# Patient Record
Sex: MALE | Race: WHITE
[De-identification: names, ages, dates, MRNs, and addresses within clinical notes are randomized per-mention and may not be internally consistent; named-entity substitution may affect disease eponyms.]

---
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
## 2019-06-30 ENCOUNTER — TRANSCRIPTION ENCOUNTER (OUTPATIENT)
Age: 19
End: 2019-06-30

## 2019-07-01 ENCOUNTER — APPOINTMENT (OUTPATIENT)
Dept: PEDIATRICS | Facility: CLINIC | Age: 19
End: 2019-07-01
Payer: COMMERCIAL

## 2019-07-01 DIAGNOSIS — Z87.09 PERSONAL HISTORY OF OTHER DISEASES OF THE RESPIRATORY SYSTEM: ICD-10-CM

## 2019-07-01 LAB — S PYO AG SPEC QL IA: NORMAL

## 2019-07-01 PROCEDURE — 99213 OFFICE O/P EST LOW 20 MIN: CPT

## 2019-07-01 PROCEDURE — 87880 STREP A ASSAY W/OPTIC: CPT | Mod: QW

## 2019-07-02 VITALS — TEMPERATURE: 98.3 F

## 2019-07-02 NOTE — PHYSICAL EXAM
[Erythematous Oropharynx] : erythematous oropharynx [NL] : warm [de-identified] : moist mucus membranes

## 2019-07-02 NOTE — DISCUSSION/SUMMARY
[FreeTextEntry1] : pharyngitis. Viral illness. Rapid strep neg C/S pending. Sympt treatment. FU if not better next few days. Sooner if worse. Diet and fluid advice given.

## 2019-07-02 NOTE — HISTORY OF PRESENT ILLNESS
[de-identified] : ST  [FreeTextEntry6] : started few days ago wit V and  D along with temp.Was seen at urgent care and hydrated with IV fluids. No more V but still some loose BM. Afebrile. Compl of ST. On no meds. Feeling better today

## 2019-07-03 LAB — BACTERIA THROAT CULT: NORMAL

## 2019-07-05 ENCOUNTER — TRANSCRIPTION ENCOUNTER (OUTPATIENT)
Age: 19
End: 2019-07-05

## 2019-07-16 ENCOUNTER — APPOINTMENT (OUTPATIENT)
Dept: PEDIATRICS | Facility: CLINIC | Age: 19
End: 2019-07-16
Payer: COMMERCIAL

## 2019-07-16 VITALS
BODY MASS INDEX: 22.93 KG/M2 | HEIGHT: 72.75 IN | HEART RATE: 84 BPM | DIASTOLIC BLOOD PRESSURE: 60 MMHG | SYSTOLIC BLOOD PRESSURE: 110 MMHG | WEIGHT: 173.04 LBS

## 2019-07-16 DIAGNOSIS — B34.9 VIRAL INFECTION, UNSPECIFIED: ICD-10-CM

## 2019-07-16 DIAGNOSIS — Z00.00 ENCOUNTER FOR GENERAL ADULT MEDICAL EXAMINATION W/OUT ABNORMAL FINDINGS: ICD-10-CM

## 2019-07-16 DIAGNOSIS — K52.9 NONINFECTIVE GASTROENTERITIS AND COLITIS, UNSPECIFIED: ICD-10-CM

## 2019-07-16 DIAGNOSIS — J30.1 ALLERGIC RHINITIS DUE TO POLLEN: ICD-10-CM

## 2019-07-16 PROCEDURE — 99395 PREV VISIT EST AGE 18-39: CPT

## 2019-07-16 PROCEDURE — 96127 BRIEF EMOTIONAL/BEHAV ASSMT: CPT

## 2019-07-16 PROCEDURE — 96160 PT-FOCUSED HLTH RISK ASSMT: CPT | Mod: 59

## 2019-07-17 PROBLEM — J30.1 SEASONAL ALLERGIC RHINITIS DUE TO POLLEN: Status: ACTIVE | Noted: 2019-07-17

## 2019-07-17 PROBLEM — K52.9 GASTROENTERITIS: Status: ACTIVE | Noted: 2019-07-17

## 2019-07-17 NOTE — REVIEW OF SYSTEMS
[Nasal Discharge] : nasal discharge [Nasal Congestion] : nasal congestion [Sinus Pressure] : no sinus pressure [Appetite Changes] : appetite changes [Diarrhea] : diarrhea [Abdominal Pain] : abdominal pain [Negative] : Genitourinary

## 2019-07-17 NOTE — PHYSICAL EXAM
[Alert] : alert [No Acute Distress] : no acute distress [Normocephalic] : normocephalic [EOMI Bilateral] : EOMI bilateral [Clear tympanic membranes with bony landmarks and light reflex present bilaterally] : clear tympanic membranes with bony landmarks and light reflex present bilaterally  [Pink Nasal Mucosa] : pink nasal mucosa [Nonerythematous Oropharynx] : nonerythematous oropharynx [Supple, full passive range of motion] : supple, full passive range of motion [No Palpable Masses] : no palpable masses [Clear to Ausculatation Bilaterally] : clear to auscultation bilaterally [Regular Rate and Rhythm] : regular rate and rhythm [Normal S1, S2 audible] : normal S1, S2 audible [No Murmurs] : no murmurs [+2 Femoral Pulses] : +2 femoral pulses [Soft] : soft [Non Distended] : non distended [Normoactive Bowel Sounds] : normoactive bowel sounds [No Hepatomegaly] : no hepatomegaly [No Splenomegaly] : no splenomegaly [No Abnormal Lymph Nodes Palpated] : no abnormal lymph nodes palpated [Normal Muscle Tone] : normal muscle tone [No Gait Asymmetry] : no gait asymmetry [No pain or deformities with palpation of bone, muscles, joints] : no pain or deformities with palpation of bone, muscles, joints [Straight] : straight [+2 Patella DTR] : +2 patella DTR [Cranial Nerves Grossly Intact] : cranial nerves grossly intact [No Rash or Lesions] : no rash or lesions [FreeTextEntry4] : Clear rhinorrhea [FreeTextEntry9] : Slight generalized tenderness. No guarding or rebound.

## 2019-07-17 NOTE — DISCUSSION/SUMMARY
[FreeTextEntry1] : Routine care was discussed. Allergic rhinitis. Gastroenteritis. Patient will restart his allergy medication. If he does not improve and antibiotics will be considered for possible occult sinusitis. The patient was also sent for stool studies. Diet and fluids were discussed. If the stool studies and negative patient will be sent to the gastroenterologist. Patient agrees with the plan. We'll followup with the results. Patient is to followup sooner if he gets worse.\par Patient will return for immunizations when well. Followup with ophthalmology

## 2019-07-17 NOTE — HISTORY OF PRESENT ILLNESS
[Yes] : Patient goes to dentist yearly [Up to date] : Up to date [Eats meals with family] : eats meals with family [Has family members/adults to turn to for help] : has family members/adults to turn to for help [Is permitted and is able to make independent decisions] : Is permitted and is able to make independent decisions [Sleep Concerns] : no sleep concerns [Eats regular meals including adequate fruits and vegetables] : eats regular meals including adequate fruits and vegetables [Calcium source] : calcium source [Has friends] : has friends [At least 1 hour of physical activity a day] : does not do at least 1 hour of physical activity a day [Screen time (except homework) less than 2 hours a day] : no screen time (except homework) less than 2 hours a day [Uses electronic nicotine delivery system] : does not use electronic nicotine delivery system [Exposure to electronic nicotine delivery system] : no exposure to electronic nicotine delivery system [Uses tobacco] : does not use tobacco [Exposure to tobacco] : no exposure to tobacco [Uses drugs] : does not use drugs  [Exposure to drugs] : no exposure to drugs [Drinks alcohol] : does not drink alcohol [Exposure to alcohol] : no exposure to alcohol [Uses safety belts/safety equipment] : uses safety belts/safety equipment  [Impaired/distracted driving] : no impaired/distracted driving [No] : Patient has not had sexual intercourse [Has ways to cope with stress] : has ways to cope with stress [Displays self-confidence] : displays self-confidence [Has problems with sleep] : does not have problems with sleep [Gets depressed, anxious, or irritable/has mood swings] : does not get depressed, anxious, or irritable/has mood swings [Has thought about hurting self or considered suicide] : has not thought about hurting self or considered suicide [With Teen] : teen [de-identified] : SELF [FreeTextEntry7] : Viral illness [de-identified] : Abdominal discomfort [de-identified] : College [FreeTextEntry1] : Patient was seen today for his physical. Patient is doing well academically and socially. He is still not feeling well. He is congested and still patient feels it is more allergies. He has not taken any medications. He has been afebrile. He has an occasional productive cough. No chest discomfort. Patient has been afebrile. He has been having loose bowel movements. Occasionally bloody mucus. Some abdominal cramping. He has had no urinary complaints. He feels tired. He has a poor appetite.

## 2019-07-23 LAB
ALBUMIN SERPL ELPH-MCNC: 4.3 G/DL
ALP BLD-CCNC: 75 U/L
ALT SERPL-CCNC: 10 U/L
ANION GAP SERPL CALC-SCNC: 10 MMOL/L
APPEARANCE: CLEAR
AST SERPL-CCNC: 16 U/L
BACTERIA STL CULT: NORMAL
BASOPHILS # BLD AUTO: 0.17 K/UL
BASOPHILS NFR BLD AUTO: 1.7 %
BILIRUB SERPL-MCNC: 0.2 MG/DL
BILIRUBIN URINE: NEGATIVE
BLOOD URINE: ABNORMAL
BUN SERPL-MCNC: 11 MG/DL
CALCIUM SERPL-MCNC: 9.6 MG/DL
CHLORIDE SERPL-SCNC: 102 MMOL/L
CHOLEST SERPL-MCNC: 155 MG/DL
CHOLEST/HDLC SERPL: 4.6 RATIO
CO2 SERPL-SCNC: 28 MMOL/L
COLOR: YELLOW
CREAT SERPL-MCNC: 1.01 MG/DL
EOSINOPHIL # BLD AUTO: 1.47 K/UL
EOSINOPHIL NFR BLD AUTO: 14.4 %
GLUCOSE QUALITATIVE U: NEGATIVE
GLUCOSE SERPL-MCNC: 91 MG/DL
HCT VFR BLD CALC: 43.8 %
HDLC SERPL-MCNC: 34 MG/DL
HEMOCCULT STL QL: POSITIVE
HGB BLD-MCNC: 13.8 G/DL
IMM GRANULOCYTES NFR BLD AUTO: 0.3 %
KETONES URINE: NEGATIVE
LDLC SERPL CALC-MCNC: 106 MG/DL
LEUKOCYTE ESTERASE URINE: NEGATIVE
LYMPHOCYTES # BLD AUTO: 2.29 K/UL
LYMPHOCYTES NFR BLD AUTO: 22.4 %
MAN DIFF?: NORMAL
MCHC RBC-ENTMCNC: 28.1 PG
MCHC RBC-ENTMCNC: 31.5 GM/DL
MCV RBC AUTO: 89.2 FL
MONOCYTES # BLD AUTO: 0.78 K/UL
MONOCYTES NFR BLD AUTO: 7.6 %
NEUTROPHILS # BLD AUTO: 5.48 K/UL
NEUTROPHILS NFR BLD AUTO: 53.6 %
NITRITE URINE: NEGATIVE
PH URINE: 6
PLATELET # BLD AUTO: 403 K/UL
POTASSIUM SERPL-SCNC: 3.9 MMOL/L
PROT SERPL-MCNC: 7.2 G/DL
PROTEIN URINE: ABNORMAL
RBC # BLD: 4.91 M/UL
RBC # FLD: 13.3 %
SODIUM SERPL-SCNC: 140 MMOL/L
SPECIFIC GRAVITY URINE: 1.03
TRIGL SERPL-MCNC: 74 MG/DL
UROBILINOGEN URINE: NORMAL
WBC # FLD AUTO: 10.22 K/UL

## 2019-07-24 LAB — DEPRECATED O AND P PREP STL: ABNORMAL

## 2019-07-29 LAB — CALPROTECTIN FECAL: 846 UG/G

## 2019-07-30 LAB
C DIFF TOX B STL QL CT TISS CULT: NORMAL
Lab: NORMAL

## 2019-08-06 ENCOUNTER — APPOINTMENT (OUTPATIENT)
Dept: GASTROENTEROLOGY | Facility: CLINIC | Age: 19
End: 2019-08-06
Payer: COMMERCIAL

## 2019-08-06 VITALS
SYSTOLIC BLOOD PRESSURE: 112 MMHG | BODY MASS INDEX: 22.8 KG/M2 | DIASTOLIC BLOOD PRESSURE: 72 MMHG | WEIGHT: 172 LBS | HEART RATE: 89 BPM | HEIGHT: 73 IN

## 2019-08-06 PROCEDURE — 99202 OFFICE O/P NEW SF 15 MIN: CPT

## 2019-08-06 RX ORDER — DICYCLOMINE HYDROCHLORIDE 20 MG/1
20 TABLET ORAL EVERY 6 HOURS
Qty: 90 | Refills: 2 | Status: ACTIVE | COMMUNITY
Start: 2019-08-06 | End: 1900-01-01

## 2019-08-06 RX ORDER — SODIUM SULFATE, POTASSIUM SULFATE, MAGNESIUM SULFATE 17.5; 3.13; 1.6 G/ML; G/ML; G/ML
17.5-3.13-1.6 SOLUTION, CONCENTRATE ORAL
Qty: 1 | Refills: 0 | Status: ACTIVE | COMMUNITY
Start: 2019-08-06 | End: 1900-01-01

## 2019-08-06 NOTE — PHYSICAL EXAM
[General Appearance - Alert] : alert [Sclera] : the sclera and conjunctiva were normal [General Appearance - In No Acute Distress] : in no acute distress [PERRL With Normal Accommodation] : pupils were equal in size, round, and reactive to light [Extraocular Movements] : extraocular movements were intact [Auscultation Breath Sounds / Voice Sounds] : lungs were clear to auscultation bilaterally [Heart Rate And Rhythm] : heart rate was normal and rhythm regular [Heart Sounds] : normal S1 and S2 [Heart Sounds Gallop] : no gallops [Murmurs] : no murmurs [Heart Sounds Pericardial Friction Rub] : no pericardial rub [Bowel Sounds] : normal bowel sounds [Abdomen Mass (___ Cm)] : no abdominal mass palpated [Abdomen Hernia] : no hernia was discovered [FreeTextEntry1] : B/L mild lower abdominal tenderness.  [Abnormal Walk] : normal gait [Nail Clubbing] : no clubbing  or cyanosis of the fingernails [Musculoskeletal - Swelling] : no joint swelling seen [Motor Tone] : muscle strength and tone were normal [Skin Color & Pigmentation] : normal skin color and pigmentation [Skin Turgor] : normal skin turgor [] : no rash [Oriented To Time, Place, And Person] : oriented to person, place, and time [Impaired Insight] : insight and judgment were intact [Affect] : the affect was normal

## 2019-08-06 NOTE — REASON FOR VISIT
[Consultation] : a consultation visit [FreeTextEntry1] : diarrhea, abdominal cramping, rectal bleeding

## 2019-08-06 NOTE — HISTORY OF PRESENT ILLNESS
[de-identified] : 18 y/o male sent in by his PCP for elevated fecal calprotectin, abdominal cramping, and diarrhea.  Pt reports going to the BR up to 6-8 times per day with diarrhea associated with rectal bleeding and mild abdominal tenderness to his lower abdomen.  Pt reports this started in June and it hasn't gotten any better and it was initially diagnosed as being possibly viral.  Pt reports BRET.  He was in [facility] several months ago.  Denies any recent sick contacts.  Denies any family hx of colon cancer, or IBD.  Denies fevers, n/v.

## 2019-08-06 NOTE — ASSESSMENT
[FreeTextEntry1] : 20 y/o with diarrhea, abdominal cramping, and rectal bleeding since July with elevated fecal calprotectin levels.\par Concerns for possible IBD- ulcerative colitis? \par [place] diet. \par Start VSL #3.\par Plan for colonoscopy. \par F/u in 2-3 weeks in office. \par Discussed w/ Dr. Dumont.

## 2019-08-21 ENCOUNTER — APPOINTMENT (OUTPATIENT)
Dept: GASTROENTEROLOGY | Facility: CLINIC | Age: 19
End: 2019-08-21
Payer: COMMERCIAL

## 2019-08-21 VITALS
SYSTOLIC BLOOD PRESSURE: 119 MMHG | BODY MASS INDEX: 21.94 KG/M2 | DIASTOLIC BLOOD PRESSURE: 66 MMHG | HEIGHT: 74 IN | HEART RATE: 98 BPM | WEIGHT: 171 LBS

## 2019-08-21 PROCEDURE — 99213 OFFICE O/P EST LOW 20 MIN: CPT

## 2019-08-21 NOTE — HISTORY OF PRESENT ILLNESS
[de-identified] : 20 y/o male sent in by his PCP for elevated fecal calprotectin, abdominal cramping, and diarrhea.  Pt reports going to the BR up to 6-8 times per day with diarrhea associated with rectal bleeding and mild abdominal tenderness to his lower abdomen.  Pt reports this started in June and it hasn't gotten any better and it was initially diagnosed as being possibly viral.  Pt reports BRET.  He was in [facility] several months ago.  He was started on Bentyl TID during initial consultation and pt reports his symptoms have significantly improved.  For the past week, but reports no further abdominal pain, cramping, or rectal bleeding.  Pt strongly wants to hold off on colonoscopy and see how he does despite knowing that it could still possibly be IBD.  Denies any recent sick contacts.  Denies any family hx of colon cancer, or IBD.  Denies fevers, n/v.

## 2019-08-21 NOTE — ASSESSMENT
[FreeTextEntry1] : 18 y/o with diarrhea, abdominal cramping, and rectal bleeding since July with elevated fecal calprotectin levels.\par Now significantly improved after starting him on bentyl, still some concerns for possible IBD- ulcerative colitis? \par \par Continue VSL#3. \par Pt wants to cancel scheduled colonoscopy as he feels 100% better having no more symptoms.\par Will f/u in 1 month, if symptoms return plan for colonoscopy and pt agrees. \par Discussed w/ Dr. Moseley.

## 2019-08-21 NOTE — REVIEW OF SYSTEMS
[As Noted in HPI] : as noted in HPI [Diarrhea] : diarrhea [Abdominal Pain] : abdominal pain [Negative] : Heme/Lymph [FreeTextEntry2] : BRET

## 2019-08-21 NOTE — PHYSICAL EXAM
[General Appearance - Alert] : alert [General Appearance - In No Acute Distress] : in no acute distress [Sclera] : the sclera and conjunctiva were normal [Extraocular Movements] : extraocular movements were intact [PERRL With Normal Accommodation] : pupils were equal in size, round, and reactive to light [Heart Rate And Rhythm] : heart rate was normal and rhythm regular [Auscultation Breath Sounds / Voice Sounds] : lungs were clear to auscultation bilaterally [Heart Sounds] : normal S1 and S2 [Murmurs] : no murmurs [Heart Sounds Gallop] : no gallops [Heart Sounds Pericardial Friction Rub] : no pericardial rub [Bowel Sounds] : normal bowel sounds [Abdomen Mass (___ Cm)] : no abdominal mass palpated [Abdomen Hernia] : no hernia was discovered [Abnormal Walk] : normal gait [Nail Clubbing] : no clubbing  or cyanosis of the fingernails [Musculoskeletal - Swelling] : no joint swelling seen [Motor Tone] : muscle strength and tone were normal [Skin Color & Pigmentation] : normal skin color and pigmentation [Skin Turgor] : normal skin turgor [Oriented To Time, Place, And Person] : oriented to person, place, and time [Impaired Insight] : insight and judgment were intact [Affect] : the affect was normal [Abdomen Soft] : soft [Abdomen Tenderness] : non-tender [] : no hepato-splenomegaly

## 2019-08-30 ENCOUNTER — APPOINTMENT (OUTPATIENT)
Dept: GASTROENTEROLOGY | Facility: AMBULATORY MEDICAL SERVICES | Age: 19
End: 2019-08-30

## 2020-05-08 ENCOUNTER — TRANSCRIPTION ENCOUNTER (OUTPATIENT)
Age: 20
End: 2020-05-08

## 2020-08-03 ENCOUNTER — APPOINTMENT (OUTPATIENT)
Dept: GASTROENTEROLOGY | Facility: CLINIC | Age: 20
End: 2020-08-03
Payer: COMMERCIAL

## 2020-08-03 ENCOUNTER — LABORATORY RESULT (OUTPATIENT)
Age: 20
End: 2020-08-03

## 2020-08-03 DIAGNOSIS — R19.7 DIARRHEA, UNSPECIFIED: ICD-10-CM

## 2020-08-03 DIAGNOSIS — R10.9 UNSPECIFIED ABDOMINAL PAIN: ICD-10-CM

## 2020-08-03 DIAGNOSIS — K62.5 HEMORRHAGE OF ANUS AND RECTUM: ICD-10-CM

## 2020-08-03 PROCEDURE — 99441: CPT

## 2020-08-03 RX ORDER — SODIUM SULFATE, POTASSIUM SULFATE, MAGNESIUM SULFATE 17.5; 3.13; 1.6 G/ML; G/ML; G/ML
17.5-3.13-1.6 SOLUTION, CONCENTRATE ORAL
Qty: 1 | Refills: 0 | Status: ACTIVE | COMMUNITY
Start: 2020-08-03 | End: 1900-01-01

## 2020-08-03 RX ORDER — DICYCLOMINE HYDROCHLORIDE 20 MG/1
20 TABLET ORAL 3 TIMES DAILY
Qty: 90 | Refills: 2 | Status: ACTIVE | COMMUNITY
Start: 2020-08-03 | End: 1900-01-01

## 2020-08-03 NOTE — ASSESSMENT
[FreeTextEntry1] : 21 yo male with abdominal cramping, diarrhea up to 6-8 times daily, occasional rectal bleeding.  In past patient was noted to have elevated fecal calprotectin concerning for ibd but never followed up for a colonoscopy.  Pt now with reoccurring symptoms and want to restart bentyl again.  No recent sick contacts, travels or abx use.  No fevers, n/v. \par \par Impression:\par R/o IBD/colitis? \par \par Plan:\par Bentyl. \par Will schedule a colonoscopy.\par \par Discussed with Dr. Ackerman.

## 2020-08-03 NOTE — REVIEW OF SYSTEMS
[As Noted in HPI] : as noted in HPI [Diarrhea] : diarrhea [Abdominal Pain] : abdominal pain [Negative] : Heme/Lymph

## 2020-08-03 NOTE — REASON FOR VISIT
[Home] : at home, [unfilled] , at the time of the visit. [Medical Office: ([facility])___] : at the medical office located in  [Verbal consent obtained from patient] : the patient, [unfilled] [Follow-Up: _____] : a [unfilled] follow-up visit

## 2020-08-03 NOTE — HISTORY OF PRESENT ILLNESS
[de-identified] : 21 yo male with abdominal cramping, diarrhea up to 6-8 times daily, occasional rectal bleeding.  In past patient was noted to have elevated fecal calprotectin concerning for ibd but never followed up for a colonoscopy.  Pt now with reoccurring symptoms and want to restart bentyl again.  No recent sick contacts, travels or abx use.  No fevers, n/v.

## 2020-08-07 ENCOUNTER — APPOINTMENT (OUTPATIENT)
Dept: GASTROENTEROLOGY | Facility: AMBULATORY MEDICAL SERVICES | Age: 20
End: 2020-08-07

## 2020-08-07 ENCOUNTER — INPATIENT (INPATIENT)
Facility: HOSPITAL | Age: 20
LOS: 2 days | Discharge: HOME | DRG: 872 | End: 2020-08-10
Attending: INTERNAL MEDICINE | Admitting: STUDENT IN AN ORGANIZED HEALTH CARE EDUCATION/TRAINING PROGRAM
Payer: COMMERCIAL

## 2020-08-07 VITALS — WEIGHT: 175.05 LBS | HEIGHT: 74 IN

## 2020-08-07 LAB
ALBUMIN SERPL ELPH-MCNC: 3.1 G/DL — LOW (ref 3.3–5)
ALP SERPL-CCNC: 76 U/L — SIGNIFICANT CHANGE UP (ref 40–120)
ALT FLD-CCNC: 27 U/L — SIGNIFICANT CHANGE UP (ref 12–78)
ANION GAP SERPL CALC-SCNC: 4 MMOL/L — LOW (ref 5–17)
APPEARANCE UR: CLEAR — SIGNIFICANT CHANGE UP
APTT BLD: 32.6 SEC — SIGNIFICANT CHANGE UP (ref 27.5–35.5)
AST SERPL-CCNC: 17 U/L — SIGNIFICANT CHANGE UP (ref 15–37)
BASOPHILS # BLD AUTO: 0.13 K/UL — SIGNIFICANT CHANGE UP (ref 0–0.2)
BASOPHILS NFR BLD AUTO: 1 % — SIGNIFICANT CHANGE UP (ref 0–2)
BILIRUB SERPL-MCNC: 0.2 MG/DL — SIGNIFICANT CHANGE UP (ref 0.2–1.2)
BILIRUB UR-MCNC: NEGATIVE — SIGNIFICANT CHANGE UP
BUN SERPL-MCNC: 8 MG/DL — SIGNIFICANT CHANGE UP (ref 7–23)
CALCIUM SERPL-MCNC: 8.6 MG/DL — SIGNIFICANT CHANGE UP (ref 8.5–10.1)
CHLORIDE SERPL-SCNC: 104 MMOL/L — SIGNIFICANT CHANGE UP (ref 96–108)
CO2 SERPL-SCNC: 31 MMOL/L — SIGNIFICANT CHANGE UP (ref 22–31)
COLOR SPEC: YELLOW — SIGNIFICANT CHANGE UP
CREAT SERPL-MCNC: 0.98 MG/DL — SIGNIFICANT CHANGE UP (ref 0.5–1.3)
DIFF PNL FLD: ABNORMAL
EOSINOPHIL # BLD AUTO: 0.13 K/UL — SIGNIFICANT CHANGE UP (ref 0–0.5)
EOSINOPHIL NFR BLD AUTO: 1 % — SIGNIFICANT CHANGE UP (ref 0–6)
GLUCOSE SERPL-MCNC: 102 MG/DL — HIGH (ref 70–99)
GLUCOSE UR QL: NEGATIVE MG/DL — SIGNIFICANT CHANGE UP
HCT VFR BLD CALC: 44.2 % — SIGNIFICANT CHANGE UP (ref 39–50)
HGB BLD-MCNC: 14.3 G/DL — SIGNIFICANT CHANGE UP (ref 13–17)
INR BLD: 1.24 RATIO — HIGH (ref 0.88–1.16)
KETONES UR-MCNC: ABNORMAL
LACTATE SERPL-SCNC: 1.2 MMOL/L — SIGNIFICANT CHANGE UP (ref 0.7–2)
LEUKOCYTE ESTERASE UR-ACNC: ABNORMAL
LIDOCAIN IGE QN: 52 U/L — LOW (ref 73–393)
LYMPHOCYTES # BLD AUTO: 18 % — SIGNIFICANT CHANGE UP (ref 13–44)
LYMPHOCYTES # BLD AUTO: 2.42 K/UL — SIGNIFICANT CHANGE UP (ref 1–3.3)
MCHC RBC-ENTMCNC: 27 PG — SIGNIFICANT CHANGE UP (ref 27–34)
MCHC RBC-ENTMCNC: 32.4 GM/DL — SIGNIFICANT CHANGE UP (ref 32–36)
MCV RBC AUTO: 83.6 FL — SIGNIFICANT CHANGE UP (ref 80–100)
MONOCYTES # BLD AUTO: 1.21 K/UL — HIGH (ref 0–0.9)
MONOCYTES NFR BLD AUTO: 9 % — SIGNIFICANT CHANGE UP (ref 2–14)
NEUTROPHILS # BLD AUTO: 9.42 K/UL — HIGH (ref 1.8–7.4)
NEUTROPHILS NFR BLD AUTO: 66 % — SIGNIFICANT CHANGE UP (ref 43–77)
NITRITE UR-MCNC: NEGATIVE — SIGNIFICANT CHANGE UP
NRBC # BLD: SIGNIFICANT CHANGE UP /100 WBCS (ref 0–0)
PH UR: 6.5 — SIGNIFICANT CHANGE UP (ref 5–8)
PLATELET # BLD AUTO: 363 K/UL — SIGNIFICANT CHANGE UP (ref 150–400)
POTASSIUM SERPL-MCNC: 3.4 MMOL/L — LOW (ref 3.5–5.3)
POTASSIUM SERPL-SCNC: 3.4 MMOL/L — LOW (ref 3.5–5.3)
PROT SERPL-MCNC: 7.3 GM/DL — SIGNIFICANT CHANGE UP (ref 6–8.3)
PROT UR-MCNC: 15 MG/DL
PROTHROM AB SERPL-ACNC: 14.2 SEC — HIGH (ref 10.6–13.6)
RBC # BLD: 5.29 M/UL — SIGNIFICANT CHANGE UP (ref 4.2–5.8)
RBC # FLD: 12.6 % — SIGNIFICANT CHANGE UP (ref 10.3–14.5)
SODIUM SERPL-SCNC: 139 MMOL/L — SIGNIFICANT CHANGE UP (ref 135–145)
SP GR SPEC: 1.02 — SIGNIFICANT CHANGE UP (ref 1.01–1.02)
UROBILINOGEN FLD QL: 1 MG/DL
WBC # BLD: 13.46 K/UL — HIGH (ref 3.8–10.5)
WBC # FLD AUTO: 13.46 K/UL — HIGH (ref 3.8–10.5)

## 2020-08-07 PROCEDURE — 74177 CT ABD & PELVIS W/CONTRAST: CPT | Mod: 26

## 2020-08-07 PROCEDURE — 85027 COMPLETE CBC AUTOMATED: CPT

## 2020-08-07 PROCEDURE — 84484 ASSAY OF TROPONIN QUANT: CPT

## 2020-08-07 PROCEDURE — U0003: CPT

## 2020-08-07 PROCEDURE — 87635 SARS-COV-2 COVID-19 AMP PRB: CPT

## 2020-08-07 PROCEDURE — 86140 C-REACTIVE PROTEIN: CPT

## 2020-08-07 PROCEDURE — 80053 COMPREHEN METABOLIC PANEL: CPT

## 2020-08-07 PROCEDURE — 87507 IADNA-DNA/RNA PROBE TQ 12-25: CPT

## 2020-08-07 PROCEDURE — 36415 COLL VENOUS BLD VENIPUNCTURE: CPT

## 2020-08-07 PROCEDURE — 82550 ASSAY OF CK (CPK): CPT

## 2020-08-07 PROCEDURE — 84100 ASSAY OF PHOSPHORUS: CPT

## 2020-08-07 PROCEDURE — 86769 SARS-COV-2 COVID-19 ANTIBODY: CPT

## 2020-08-07 PROCEDURE — 85379 FIBRIN DEGRADATION QUANT: CPT

## 2020-08-07 PROCEDURE — 83605 ASSAY OF LACTIC ACID: CPT

## 2020-08-07 PROCEDURE — 84145 PROCALCITONIN (PCT): CPT

## 2020-08-07 PROCEDURE — 82728 ASSAY OF FERRITIN: CPT

## 2020-08-07 PROCEDURE — 83615 LACTATE (LD) (LDH) ENZYME: CPT

## 2020-08-07 PROCEDURE — 83735 ASSAY OF MAGNESIUM: CPT

## 2020-08-07 RX ORDER — METRONIDAZOLE 500 MG
500 TABLET ORAL ONCE
Refills: 0 | Status: COMPLETED | OUTPATIENT
Start: 2020-08-07 | End: 2020-08-07

## 2020-08-07 RX ORDER — METRONIDAZOLE 500 MG
500 TABLET ORAL ONCE
Refills: 0 | Status: DISCONTINUED | OUTPATIENT
Start: 2020-08-07 | End: 2020-08-07

## 2020-08-07 RX ORDER — CIPROFLOXACIN LACTATE 400MG/40ML
400 VIAL (ML) INTRAVENOUS ONCE
Refills: 0 | Status: COMPLETED | OUTPATIENT
Start: 2020-08-07 | End: 2020-08-07

## 2020-08-07 RX ORDER — PANTOPRAZOLE SODIUM 20 MG/1
40 TABLET, DELAYED RELEASE ORAL ONCE
Refills: 0 | Status: DISCONTINUED | OUTPATIENT
Start: 2020-08-07 | End: 2020-08-07

## 2020-08-07 RX ORDER — CIPROFLOXACIN LACTATE 400MG/40ML
500 VIAL (ML) INTRAVENOUS ONCE
Refills: 0 | Status: DISCONTINUED | OUTPATIENT
Start: 2020-08-07 | End: 2020-08-07

## 2020-08-07 RX ORDER — ACETAMINOPHEN 500 MG
1000 TABLET ORAL ONCE
Refills: 0 | Status: COMPLETED | OUTPATIENT
Start: 2020-08-07 | End: 2020-08-07

## 2020-08-07 RX ORDER — SODIUM CHLORIDE 9 MG/ML
2000 INJECTION INTRAMUSCULAR; INTRAVENOUS; SUBCUTANEOUS ONCE
Refills: 0 | Status: COMPLETED | OUTPATIENT
Start: 2020-08-07 | End: 2020-08-07

## 2020-08-07 RX ORDER — PANTOPRAZOLE SODIUM 20 MG/1
40 TABLET, DELAYED RELEASE ORAL ONCE
Refills: 0 | Status: COMPLETED | OUTPATIENT
Start: 2020-08-07 | End: 2020-08-07

## 2020-08-07 RX ADMIN — SODIUM CHLORIDE 2000 MILLILITER(S): 9 INJECTION INTRAMUSCULAR; INTRAVENOUS; SUBCUTANEOUS at 23:49

## 2020-08-07 RX ADMIN — PANTOPRAZOLE SODIUM 40 MILLIGRAM(S): 20 TABLET, DELAYED RELEASE ORAL at 23:47

## 2020-08-07 RX ADMIN — Medication 100 MILLIGRAM(S): at 23:48

## 2020-08-07 RX ADMIN — Medication 1000 MILLIGRAM(S): at 23:48

## 2020-08-07 NOTE — ED ADULT NURSE NOTE - NSIMPLEMENTINTERV_GEN_ALL_ED
Implemented All Universal Safety Interventions:  [place] to call system. Call bell, personal items and telephone within reach. Instruct patient to call for assistance. Room bathroom lighting operational. Non-slip footwear when patient is off stretcher. Physically safe environment: no spills, clutter or unnecessary equipment. Stretcher in lowest position, wheels locked, appropriate side rails in place.

## 2020-08-07 NOTE — ED ADULT TRIAGE NOTE - CHIEF COMPLAINT QUOTE
Ambulatory from home reporting BRBPR every time he has a BM. Patient was scheduled for a colonoscopy last week however tested COVID + prior to exam and it was cancelled. Patient also reports transverse lower abdominal pain, takes DICYCLOMINE regularly without any relief over the past few days. Denies N/V, CP/SOB, fevers or travel.

## 2020-08-07 NOTE — ED STATDOCS - NSFOLLOWUPINSTRUCTIONS_ED_ALL_ED_FT
COLITIS - AfterCare(R) Instructions(ER/ED)     Colitis    WHAT YOU NEED TO KNOW:    Colitis is swelling and irritation of your colon. Colitis may be caused by ulcers or a problem with your immune system. Bacteria, a virus, or a parasite may also cause colitis. The cause may not be known. You may have diarrhea, abdominal pain, fever, or blood or mucus in your bowel movement.    DISCHARGE INSTRUCTIONS:    Return to the emergency department if:     You have sudden trouble breathing.      Your bowel movements are black or have blood in them.      You have blood in your vomit.      You have severe abdominal pain or your abdomen is swollen and feels hard.      You have any of the following signs of dehydration:   Dizziness or weakness      Dry mouth, cracked lips, or severe thirst      Fast heartbeat or breathing      Urinating very little or not at all    Call your doctor if:     Your symptoms get worse or do not go away.      You have a fever, chills, cough, or feel weak and achy.      You suddenly lose weight without trying.      You have questions or concerns about your condition or care.    Medicines:     Medicines may be given to decrease inflammation in your colon and treat diarrhea.      Take your medicine as directed. Contact your healthcare provider if you think your medicine is not helping or if you have side effects. Tell him of her if you are allergic to any medicine. Keep a list of the medicines, vitamins, and herbs you take. Include the amounts, and when and why you take them. Bring the list or the pill bottles to follow-up visits. Carry your medicine list with you in case of an emergency.    Manage your symptoms:     Drink liquids as directed to help prevent dehydration. Good liquids to drink include water, juice, and broth. Ask how much liquid to drink each day. You may need to drink an oral rehydration solution (ORS). An ORS contains a balance of water, salt, and sugar to replace body fluids lost during diarrhea.      Eat a variety of healthy foods. Healthy foods include fruits, vegetables, whole-grain breads, beans, low-fat dairy products, lean meats, and fish. You may need to eat several small meals throughout the day instead of large meals. Avoid spicy foods, caffeine, chocolate, and foods high in fat.      Talk to your healthcare provider before you take NSAIDs. NSAIDs can cause worsen your symptoms if ulcers are causing your colitis.      Start to exercise when you feel better. Regular exercise helps your bowels work normally. Ask about the best exercise plan for you.    Prevent the spread of germs:          Wash your hands often. Wash your hands several times each day. Wash after you use the bathroom, change a child's diaper, and before you prepare or eat food. Use soap and water every time. Rub your soapy hands together, lacing your fingers. Wash the front and back of your hands, and in between your fingers. Use the fingers of one hand to scrub under the fingernails of the other hand. Wash for at least 20 seconds. Rinse with warm, running water for several seconds. Then dry your hands with a clean towel or paper towel. Use hand  that contains alcohol if soap and water are not available. Do not touch your eyes, nose, or mouth without washing your hands first.Handwashing           Cover a sneeze or cough. Use a tissue that covers your mouth and nose. Throw the tissue away in a trash can right away. Use the bend of your arm if a tissue is not available. Wash your hands well with soap and water or use a hand .      Clean surfaces often. Clean doorknobs, countertops, cell phones, and other surfaces that are touched often. Use a disinfecting wipe, a single-use sponge, or a cloth you can wash and reuse. Use disinfecting  if you do not have wipes. You can create a disinfecting  by mixing 1 part bleach with 10 parts water.      Ask about vaccines you may need. Vaccines help prevent disease caused by some viruses and bacteria. Get the influenza (flu) vaccine as soon as recommended each year. The flu vaccine is usually available starting in September or October. Flu viruses change, so it is important to get a flu vaccine every year. Get the pneumonia vaccine if recommended. This vaccine is usually recommended every 5 years. Your provider will tell you when to get this vaccine, if needed. Your healthcare provider can tell you if you should get other vaccines, and when to get them.    Follow up with your doctor as directed: You may need to return for a colonoscopy or other tests. Write down how often you have a bowel movements and what they look like. Bring this to your follow-up visits. Write down your questions so you remember to ask them during your visits.

## 2020-08-07 NOTE — ED ADULT NURSE NOTE - CHPI ED NUR SYMPTOMS NEG
no dysuria/no hematuria/no burning urination/no chills/no vomiting/no nausea/no abdominal distension/no fever

## 2020-08-07 NOTE — ED STATDOCS - OBJECTIVE STATEMENT
21 y/o M with PMHx of gastroenteritis presents ambulatory to the ED c/o +BRB with every BM for the past week. Endorses associated +lower abd pain and +fevers. Pt + for COVID-19 on 8/4. NKDA. PCP: Dr. Linda Kim.

## 2020-08-07 NOTE — ED STATDOCS - CARE PROVIDER_API CALL
Nick Ackerman  GASTROENTEROLOGY  [address] [place] [address]  Phone: [phone]  Fax: [phone]  Follow Up Time: Urgent

## 2020-08-07 NOTE — ED STATDOCS - NS_ ATTENDINGSCRIBEDETAILS _ED_A_ED_FT
I, Reed Renee MD,  performed the initial face to face bedside interview with this patient regarding history of present illness, review of symptoms and relevant past medical, social and family history.  I completed an independent physical examination.    The history, relevant review of systems, past medical and surgical history, medical decision making, and physical examination was documented by the scribe in my presence and I attest to the accuracy of the documentation.

## 2020-08-07 NOTE — ED STATDOCS - CLINICAL SUMMARY MEDICAL DECISION MAKING FREE TEXT BOX
Labs, imaging, and reassess. Labs, imaging, and reassess.    PA note: Patient is a 19 y/o M with PMHx of gastroenteritis, colitis in 2019 who presents ambulatory to [facility] c/o +BRB with every BM for the past week. Endorses associated +lower abd pain and +fevers. Pt + for COVID-19 on 8/4/2020. Patient has been trying to see a GI doctor but he was asked to get a COVID test first, which turned out to be positive. Patient decided to come to ED for worsening abd pain. ~Jesse Singer PA-C   Patient seen and evaluated at bedside. Will get labs, CT scan. Reassess. ~Jesse Singer PA-C  CT +filomena for Proctocolitis, +Febrile. Admitted patient to hospitalist Dr. RADHA Carlos. ~Jesse Singer PA-C

## 2020-08-07 NOTE — ED STATDOCS - PROGRESS NOTE DETAILS
PA note: Patient is a 19 y/o M with PMHx of gastroenteritis, colitis in 2019 who presents ambulatory to [facility] c/o +BRB with every BM for the past week. Endorses associated +lower abd pain and +fevers. Pt + for COVID-19 on 8/4/2020. Patient has been trying to see a GI doctor but he was asked to get a COVID test first, which turned out to be positive. Patient decided to come to ED for worsening abd pain. ~Jesse Singer PA-C   Patient seen and evaluated at bedside. Will get labs, CT scan. Reassess. ~Jesse Singer PA-C Waiting on CT results. ~Jesse Singer PA-C Patient is +febrile, appears uncomfortable and ill appearing. Will admit patient. ~Jesse Singer PA-C Case discussed with hospitalist Dr. Carlos, will admit patient. ~Jesse Singer PA-C

## 2020-08-07 NOTE — ED STATDOCS - PHYSICAL EXAMINATION
PA NOTE: GEN: AOX3, NAD. HEENT: Throat clear. Airway is patent. EYES: PERRLA. EOMI. Head: NC/AT. NECK: Supple, No JVD. FROM. C-spine non-tender. CV:S1S2, RRR, LUNGS: Non-labored breathing, no tachypnea. O2sat 100% RA. CTA b/l. No w/r/r. CHEST: Equal chest expansion and rise. No deformity. ABD: Soft, +Mild diffuse tenderness lower abd. No rebound, no guarding. No CVAT. EXT: No e/c/c. 2+ distal pulses. SKIN: No rashes. NEURO: No focal deficits. CN II-XII intact. FROM. 5/5 motor and sensory. ~Jesse Singer PA-C

## 2020-08-07 NOTE — ED STATDOCS - CARE PLAN
Principal Discharge DX:	Proctocolitis Principal Discharge DX:	Proctocolitis  Secondary Diagnosis:	Sepsis

## 2020-08-08 DIAGNOSIS — K52.9 NONINFECTIVE GASTROENTERITIS AND COLITIS, UNSPECIFIED: ICD-10-CM

## 2020-08-08 DIAGNOSIS — U07.1 COVID-19: ICD-10-CM

## 2020-08-08 LAB
ALBUMIN SERPL ELPH-MCNC: 2.6 G/DL — LOW (ref 3.3–5)
ALP SERPL-CCNC: 60 U/L — SIGNIFICANT CHANGE UP (ref 40–120)
ALT FLD-CCNC: 21 U/L — SIGNIFICANT CHANGE UP (ref 12–78)
ANION GAP SERPL CALC-SCNC: 5 MMOL/L — SIGNIFICANT CHANGE UP (ref 5–17)
AST SERPL-CCNC: 12 U/L — LOW (ref 15–37)
BILIRUB SERPL-MCNC: 0.3 MG/DL — SIGNIFICANT CHANGE UP (ref 0.2–1.2)
BUN SERPL-MCNC: 5 MG/DL — LOW (ref 7–23)
CALCIUM SERPL-MCNC: 8.2 MG/DL — LOW (ref 8.5–10.1)
CHLORIDE SERPL-SCNC: 107 MMOL/L — SIGNIFICANT CHANGE UP (ref 96–108)
CK SERPL-CCNC: 40 U/L — SIGNIFICANT CHANGE UP (ref 26–308)
CO2 SERPL-SCNC: 29 MMOL/L — SIGNIFICANT CHANGE UP (ref 22–31)
CREAT SERPL-MCNC: 0.82 MG/DL — SIGNIFICANT CHANGE UP (ref 0.5–1.3)
CRP SERPL-MCNC: 7.11 MG/DL — HIGH (ref 0–0.4)
D DIMER BLD IA.RAPID-MCNC: 1554 NG/ML DDU — HIGH
FERRITIN SERPL-MCNC: 53 NG/ML — SIGNIFICANT CHANGE UP (ref 30–400)
GLUCOSE SERPL-MCNC: 90 MG/DL — SIGNIFICANT CHANGE UP (ref 70–99)
HCT VFR BLD CALC: 40.5 % — SIGNIFICANT CHANGE UP (ref 39–50)
HGB BLD-MCNC: 12.7 G/DL — LOW (ref 13–17)
LACTATE SERPL-SCNC: 2 MMOL/L — SIGNIFICANT CHANGE UP (ref 0.7–2)
LDH SERPL L TO P-CCNC: 105 U/L — SIGNIFICANT CHANGE UP (ref 84–241)
MAGNESIUM SERPL-MCNC: 2.2 MG/DL — SIGNIFICANT CHANGE UP (ref 1.6–2.6)
MCHC RBC-ENTMCNC: 26.8 PG — LOW (ref 27–34)
MCHC RBC-ENTMCNC: 31.4 GM/DL — LOW (ref 32–36)
MCV RBC AUTO: 85.4 FL — SIGNIFICANT CHANGE UP (ref 80–100)
PHOSPHATE SERPL-MCNC: 3.1 MG/DL — SIGNIFICANT CHANGE UP (ref 2.5–4.5)
PLATELET # BLD AUTO: 295 K/UL — SIGNIFICANT CHANGE UP (ref 150–400)
POTASSIUM SERPL-MCNC: 3.7 MMOL/L — SIGNIFICANT CHANGE UP (ref 3.5–5.3)
POTASSIUM SERPL-SCNC: 3.7 MMOL/L — SIGNIFICANT CHANGE UP (ref 3.5–5.3)
PROCALCITONIN SERPL-MCNC: 0.07 NG/ML — SIGNIFICANT CHANGE UP (ref 0.02–0.1)
PROT SERPL-MCNC: 6.1 GM/DL — SIGNIFICANT CHANGE UP (ref 6–8.3)
RBC # BLD: 4.74 M/UL — SIGNIFICANT CHANGE UP (ref 4.2–5.8)
RBC # FLD: 12.7 % — SIGNIFICANT CHANGE UP (ref 10.3–14.5)
SARS-COV-2 IGG SERPL QL IA: POSITIVE
SARS-COV-2 IGM SERPL IA-ACNC: 121 INDEX — HIGH
SARS-COV-2 RNA SPEC QL NAA+PROBE: SIGNIFICANT CHANGE UP
SODIUM SERPL-SCNC: 141 MMOL/L — SIGNIFICANT CHANGE UP (ref 135–145)
TROPONIN I SERPL-MCNC: <0.015 NG/ML — SIGNIFICANT CHANGE UP (ref 0.01–0.04)
WBC # BLD: 14.7 K/UL — HIGH (ref 3.8–10.5)
WBC # FLD AUTO: 14.7 K/UL — HIGH (ref 3.8–10.5)

## 2020-08-08 PROCEDURE — 99223 1ST HOSP IP/OBS HIGH 75: CPT

## 2020-08-08 RX ORDER — METRONIDAZOLE 500 MG
500 TABLET ORAL EVERY 8 HOURS
Refills: 0 | Status: DISCONTINUED | OUTPATIENT
Start: 2020-08-08 | End: 2020-08-10

## 2020-08-08 RX ORDER — ACETAMINOPHEN 500 MG
650 TABLET ORAL EVERY 4 HOURS
Refills: 0 | Status: DISCONTINUED | OUTPATIENT
Start: 2020-08-08 | End: 2020-08-10

## 2020-08-08 RX ORDER — ALBUTEROL 90 UG/1
2 AEROSOL, METERED ORAL EVERY 4 HOURS
Refills: 0 | Status: DISCONTINUED | OUTPATIENT
Start: 2020-08-08 | End: 2020-08-08

## 2020-08-08 RX ORDER — SODIUM CHLORIDE 9 MG/ML
1000 INJECTION INTRAMUSCULAR; INTRAVENOUS; SUBCUTANEOUS
Refills: 0 | Status: DISCONTINUED | OUTPATIENT
Start: 2020-08-08 | End: 2020-08-10

## 2020-08-08 RX ORDER — CIPROFLOXACIN LACTATE 400MG/40ML
400 VIAL (ML) INTRAVENOUS EVERY 12 HOURS
Refills: 0 | Status: DISCONTINUED | OUTPATIENT
Start: 2020-08-08 | End: 2020-08-10

## 2020-08-08 RX ORDER — POTASSIUM CHLORIDE 20 MEQ
10 PACKET (EA) ORAL
Refills: 0 | Status: COMPLETED | OUTPATIENT
Start: 2020-08-08 | End: 2020-08-08

## 2020-08-08 RX ORDER — ALBUTEROL 90 UG/1
2 AEROSOL, METERED ORAL EVERY 6 HOURS
Refills: 0 | Status: DISCONTINUED | OUTPATIENT
Start: 2020-08-08 | End: 2020-08-10

## 2020-08-08 RX ADMIN — Medication 100 MILLIEQUIVALENT(S): at 06:44

## 2020-08-08 RX ADMIN — Medication 650 MILLIGRAM(S): at 12:59

## 2020-08-08 RX ADMIN — SODIUM CHLORIDE 75 MILLILITER(S): 9 INJECTION INTRAMUSCULAR; INTRAVENOUS; SUBCUTANEOUS at 17:27

## 2020-08-08 RX ADMIN — Medication 100 MILLIGRAM(S): at 20:48

## 2020-08-08 RX ADMIN — Medication 500 MILLIGRAM(S): at 01:10

## 2020-08-08 RX ADMIN — Medication 200 MILLIGRAM(S): at 01:14

## 2020-08-08 RX ADMIN — Medication 100 MILLIGRAM(S): at 05:49

## 2020-08-08 RX ADMIN — Medication 200 MILLIGRAM(S): at 12:07

## 2020-08-08 RX ADMIN — Medication 650 MILLIGRAM(S): at 17:27

## 2020-08-08 RX ADMIN — Medication 100 MILLIEQUIVALENT(S): at 05:30

## 2020-08-08 RX ADMIN — SODIUM CHLORIDE 75 MILLILITER(S): 9 INJECTION INTRAMUSCULAR; INTRAVENOUS; SUBCUTANEOUS at 04:18

## 2020-08-08 RX ADMIN — Medication 100 MILLIEQUIVALENT(S): at 09:04

## 2020-08-08 RX ADMIN — Medication 200 MILLIGRAM(S): at 22:00

## 2020-08-08 RX ADMIN — Medication 100 MILLIGRAM(S): at 14:00

## 2020-08-08 NOTE — PROGRESS NOTE ADULT - SUBJECTIVE AND OBJECTIVE BOX
Subjective:  still having abdominal cramps    MEDICATIONS  (STANDING):  ciprofloxacin   IVPB 400 milliGRAM(s) IV Intermittent every 12 hours  metroNIDAZOLE  IVPB 500 milliGRAM(s) IV Intermittent every 8 hours  sodium chloride 0.9%. 1000 milliLiter(s) (75 mL/Hr) IV Continuous <Continuous>    MEDICATIONS  (PRN):  acetaminophen   Tablet .. 650 milliGRAM(s) Oral every 4 hours PRN Temp greater or equal to 38C (100.4F), Mild Pain (1 - 3)  ALBUTerol    90 MICROgram(s) HFA Inhaler 2 Puff(s) Inhalation every 6 hours PRN Shortness of Breath and/or Wheezing      Allergies    No Known Allergies    Intolerances        REVIEW OF SYSTEMS:    CONSTITUTIONAL:  As per HPI.  HEENT:  Eyes:  No diplopia or blurred vision. ENT:  No earache, sore throat or runny nose.  CARDIOVASCULAR:  No pressure, squeezing, tightness, heaviness or aching about the chest, neck, axilla or epigastrium.  RESPIRATORY:  No cough, shortness of breath, PND or orthopnea.  GASTROINTESTINAL: diarrhea, bloody  GENITOURINARY:  No dysuria, frequency or urgency.  MUSCULOSKELETAL:  no joint pain, deformity, tenderness  EXTREMITIES: no clubbing cyanosis,edema  SKIN:  No change in skin, hair or nails.  NEUROLOGIC:  No paresthesias, fasciculations, seizures or weakness.  PSYCHIATRIC:  No disorder of thought or mood.  ENDOCRINE:  No heat or cold intolerance, polyuria or polydipsia.  HEMATOLOGICAL:  No easy bruising or bleedings:    Vital Signs Last 24 Hrs  T(C): 35.9 (08 Aug 2020 08:37), Max: 39 (07 Aug 2020 23:38)  T(F): 96.6 (08 Aug 2020 08:37), Max: 102.2 (07 Aug 2020 23:38)  HR: 81 (08 Aug 2020 08:37) (81 - 106)  BP: 110/65 (08 Aug 2020 08:37) (109/55 - 133/74)  BP(mean): --  RR: 17 (08 Aug 2020 08:37) (16 - 20)  SpO2: 100% (08 Aug 2020 08:37) (97% - 100%)    PHYSICAL EXAMINATION:  SKIN: no rashes  HEAD: NC/AT  EYES: PERRLA, EOMI  EARS: TM's intact  NOSE: no abnormalities  NECK:  Supple. No lymphadenopathy. Jugular venous pressure not elevated. Carotids equal.   HEART:   The cardiac impulse has a normal quality. Reg., Nl S1 and S2.  There are no murmurs, rubs or gallops noted  CHEST:  Chest is clear to auscultation. Normal respiratory effort.  ABDOMEN:  Soft, mild LLQ tenderness  EXTREMITIES:  no C/C/E  NEURO: AAO x 3, no focal deficts       LABS:                        12.7   14.70 )-----------( 295      ( 08 Aug 2020 10:45 )             40.5     08-08    141  |  107  |  5<L>  ----------------------------<  90  3.7   |  29  |  0.82    Ca    8.2<L>      08 Aug 2020 10:45  Phos  3.1     08-08  Mg     2.2     08-08    TPro  6.1  /  Alb  2.6<L>  /  TBili  0.3  /  DBili  x   /  AST  12<L>  /  ALT  21  /  AlkPhos  60  08-08    PT/INR - ( 07 Aug 2020 21:46 )   PT: 14.2 sec;   INR: 1.24 ratio         PTT - ( 07 Aug 2020 21:46 )  PTT:32.6 sec  Urinalysis Basic - ( 07 Aug 2020 21:46 )    Color: Yellow / Appearance: Clear / S.020 / pH: x  Gluc: x / Ketone: Trace  / Bili: Negative / Urobili: 1 mg/dL   Blood: x / Protein: 15 mg/dL / Nitrite: Negative   Leuk Esterase: Trace / RBC: 0-2 /HPF / WBC 0-2   Sq Epi: x / Non Sq Epi: Occasional / Bacteria: Occasional        RADIOLOGY & ADDITIONAL TESTS:

## 2020-08-08 NOTE — H&P ADULT - HISTORY OF PRESENT ILLNESS
: Patient is a 19 y/o M with PMHx of gastroenteritis, colitis in 2019 who presents ambulatory to [facility] c/o +BRB with every BM for the past week. Endorses associated +lower abd pain and +fevers. Pt + for COVID-19 on 8/4/2020. Patient has been trying to see a GI doctor but he was asked to get a COVID test first, which turned out to be positive. Patient decided to come to ED for worsening abd pain. 19 y/o Male with PMHx of gastroenteritis, colitis in 2019 who presents ambulatory to [facility] with complain of bright red blood with every Bowel movement for the past week. Endorses associated +lower abd pain and +fevers. Patient + for COVID-19 on 8/4/2020 in GI clinic. Patient has been trying to see a GI doctor but he was asked to get a COVID test first, which turned out to be positive. Patient decided to come to ED for worsening abdominal pain.    PMHx:  Colitis

## 2020-08-08 NOTE — H&P ADULT - NSHPPHYSICALEXAM_GEN_ALL_CORE
Vital Signs Last 24 Hrs  T(C): 37 (08 Aug 2020 04:00), Max: 39 (07 Aug 2020 23:38)  T(F): 98.6 (08 Aug 2020 04:00), Max: 102.2 (07 Aug 2020 23:38)  HR: 90 (08 Aug 2020 04:00) (90 - 106)  BP: 109/55 (08 Aug 2020 04:00) (109/55 - 133/74)  RR: 16 (08 Aug 2020 04:00) (16 - 20)  SpO2: 98% (08 Aug 2020 04:00) (97% - 100%)

## 2020-08-08 NOTE — H&P ADULT - NEUROLOGICAL DETAILS
deep reflexes intact/cranial nerves intact/normal strength/sensation intact/responds to verbal commands/alert and oriented x 3/responds to pain

## 2020-08-08 NOTE — CONSULT NOTE ADULT - SUBJECTIVE AND OBJECTIVE BOX
GI consult-coverage for Albino Moseley/Meka    HPI:  21 y/o Male with PMHx of gastroenteritis, colitis in 2019 who presents ambulatory to [facility] with complain of bright red blood with every Bowel movement for the past week. Endorses associated +lower abd pain and +fevers. Patient + for COVID-19 on 8/4/2020 in GI clinic. Patient has been trying to see a GI doctor but he was asked to get a COVID test first, which turned out to be positive. Patient decided to come to ED for worsening abdominal pain.  States had similar issues last year that resolved. Never had colonoscopy and was scheduled for one but tested + for COVID (no symptoms and here first PCR is negative).  No weight loss but frequent loose bloody stools. CT shows colitis primarily proctosigmoiditis but extends to include transverse colon.    PAST MEDICAL & SURGICAL HISTORY:  No significant past surgical history    Home Medications:  dicyclomine 20 mg oral tablet: 1 tab(s) orally 3 times a day (08 Aug 2020 00:04)    MEDICATIONS  (STANDING):  ciprofloxacin   IVPB 400 milliGRAM(s) IV Intermittent every 12 hours  metroNIDAZOLE  IVPB 500 milliGRAM(s) IV Intermittent every 8 hours  sodium chloride 0.9%. 1000 milliLiter(s) (75 mL/Hr) IV Continuous <Continuous>    MEDICATIONS  (PRN):  acetaminophen   Tablet .. 650 milliGRAM(s) Oral every 4 hours PRN Temp greater or equal to 38C (100.4F), Mild Pain (1 - 3)  ALBUTerol    90 MICROgram(s) HFA Inhaler 2 Puff(s) Inhalation every 6 hours PRN Shortness of Breath and/or Wheezing      Allergies    No Known Allergies    Intolerances        SOCIAL HISTORY: college student, denies tob/EtOH    FAMILY HISTORY:  No pertinent family history in first degree relatives  no h/o UC/Crohn's or CRC      ROS  As above  Otherwise unremarkable, all systems reviewed    PE:  Vital Signs Last 24 Hrs  T(C): 37.1 (08 Aug 2020 15:49), Max: 39 (07 Aug 2020 23:38)  T(F): 98.8 (08 Aug 2020 15:49), Max: 102.2 (07 Aug 2020 23:38)  HR: 87 (08 Aug 2020 15:49) (81 - 106)  BP: 108/54 (08 Aug 2020 15:49) (108/54 - 133/74)  BP(mean): --  RR: 18 (08 Aug 2020 15:49) (16 - 20)  SpO2: 99% (08 Aug 2020 15:49) (97% - 100%)    Constitutional: NAD, well-developed, A+Ox3, seems uncomfortable  Anicteric   Respiratory: CTABL, breathing comfortably  Cardiovascular: S1 and S2, RRR  Gastrointestinal: +BS, soft, non tender, non distended, no mass  Extremities: warm, well perfused, no edema  Psychiatric: Normal mood, normal affect  Neuro: moves all extremities, grossly intact  Skin: No rashes or lesions    LABS:                        12.7   14.70 )-----------( 295      ( 08 Aug 2020 10:45 )             40.5     08-08    141  |  107  |  5<L>  ----------------------------<  90  3.7   |  29  |  0.82    Ca    8.2<L>      08 Aug 2020 10:45  Phos  3.1     08-08  Mg     2.2     08-08    TPro  6.1  /  Alb  2.6<L>  /  TBili  0.3  /  DBili  x   /  AST  12<L>  /  ALT  21  /  AlkPhos  60  08-08    PT/INR - ( 07 Aug 2020 21:46 )   PT: 14.2 sec;   INR: 1.24 ratio         PTT - ( 07 Aug 2020 21:46 )  PTT:32.6 sec  LIVER FUNCTIONS - ( 08 Aug 2020 10:45 )  Alb: 2.6 g/dL / Pro: 6.1 gm/dL / ALK PHOS: 60 U/L / ALT: 21 U/L / AST: 12 U/L / GGT: x             RADIOLOGY & ADDITIONAL STUDIES:  CT images reviewed-see HPI

## 2020-08-08 NOTE — H&P ADULT - ASSESSMENT
21 yo Male presented with abdominal pain.    A/P:    1.  Colitis  -started on IV cipro and flagyl  -keep NPO and on IVF  -follow GI consult  -follow clinically  -follow labs     2.  COVID Treatment Plan:  - Maintain on airborne isolation.  - Continue with O2 as needed via nasal cannula and up-titrate as needed.   - Obtain daily room air O2 saturations once O2 requirements stabilize.  - Acetaminophen 650 mg PO q4h PRN fever.   - HFA albuterol Q6 hour PRN via MDI.   -Defer systemic steroids/interleukin inhibitor at this time; would consider if inflammatory markers are elevated and patient has worsening hypoxia.  - Labs Testing: Daily or As Needed  - No pharmacologic DVT PPx: due to ongoing BRBPR  - Goals of Care discussion had with patient/surrogate: Full code.     3.  Hypokalemia  -will replace k.     4.  SCD for DVT ppx 19 yo Male presented with abdominal pain.    A/P:    1.  Sepsis  Colitis  -started on IV cipro and flagyl  -keep NPO and on IVF  -follow GI consult  -follow clinically  -follow labs     2.  COVID Treatment Plan:  - Maintain on airborne isolation.  - Continue with O2 as needed via nasal cannula and up-titrate as needed.   - Obtain daily room air O2 saturations once O2 requirements stabilize.  - Acetaminophen 650 mg PO q4h PRN fever.   - HFA albuterol Q6 hour PRN via MDI.   -Defer systemic steroids/interleukin inhibitor at this time; would consider if inflammatory markers are elevated and patient has worsening hypoxia.  - Labs Testing: Daily or As Needed  - No pharmacologic DVT PPx: due to ongoing BRBPR  - Goals of Care discussion had with patient/surrogate: Full code.     3.  Hypokalemia  -will replace k.     4.  SCD for DVT ppx

## 2020-08-08 NOTE — PROGRESS NOTE ADULT - ASSESSMENT
- CT abdomen/pelvis findings show proctocolitis  - cont cipro/flagyl  - GI eval pending  - cont NPO w ivf  - had positive covid PCR on 8/4 but is negative in hospital  - will repeat PCR and id negative can d/c isolation

## 2020-08-08 NOTE — CONSULT NOTE ADULT - ASSESSMENT
20M with colitis, likely chronic, r/o UC, r/o CD, r/o infectious colitis.    Rec:  -f/u stool tests  -clear diet  -cont empiric abx for now  -will need colonoscopy once COVID-19 status more clear  -check CRP  -recommend heparin SC for DVT ppx despite bleeding since risk of clot is high in IBD  -pt seen in coverage for Dr. Moseley/Meka

## 2020-08-09 LAB
CRP SERPL-MCNC: 8.39 MG/DL — HIGH (ref 0–0.4)
CULTURE RESULTS: SIGNIFICANT CHANGE UP
SARS-COV-2 RNA SPEC QL NAA+PROBE: SIGNIFICANT CHANGE UP
SPECIMEN SOURCE: SIGNIFICANT CHANGE UP

## 2020-08-09 PROCEDURE — 99232 SBSQ HOSP IP/OBS MODERATE 35: CPT

## 2020-08-09 RX ORDER — HEPARIN SODIUM 5000 [USP'U]/ML
5000 INJECTION INTRAVENOUS; SUBCUTANEOUS EVERY 12 HOURS
Refills: 0 | Status: DISCONTINUED | OUTPATIENT
Start: 2020-08-09 | End: 2020-08-10

## 2020-08-09 RX ORDER — ONDANSETRON 8 MG/1
4 TABLET, FILM COATED ORAL EVERY 6 HOURS
Refills: 0 | Status: COMPLETED | OUTPATIENT
Start: 2020-08-09 | End: 2020-08-10

## 2020-08-09 RX ORDER — FAMOTIDINE 10 MG/ML
20 INJECTION INTRAVENOUS ONCE
Refills: 0 | Status: COMPLETED | OUTPATIENT
Start: 2020-08-09 | End: 2020-08-09

## 2020-08-09 RX ADMIN — Medication 100 MILLIGRAM(S): at 13:38

## 2020-08-09 RX ADMIN — HEPARIN SODIUM 5000 UNIT(S): 5000 INJECTION INTRAVENOUS; SUBCUTANEOUS at 21:21

## 2020-08-09 RX ADMIN — Medication 650 MILLIGRAM(S): at 07:00

## 2020-08-09 RX ADMIN — Medication 100 MILLIGRAM(S): at 21:21

## 2020-08-09 RX ADMIN — Medication 650 MILLIGRAM(S): at 10:39

## 2020-08-09 RX ADMIN — Medication 100 MILLIGRAM(S): at 06:07

## 2020-08-09 RX ADMIN — Medication 200 MILLIGRAM(S): at 21:21

## 2020-08-09 RX ADMIN — Medication 650 MILLIGRAM(S): at 03:30

## 2020-08-09 RX ADMIN — FAMOTIDINE 20 MILLIGRAM(S): 10 INJECTION INTRAVENOUS at 22:13

## 2020-08-09 RX ADMIN — HEPARIN SODIUM 5000 UNIT(S): 5000 INJECTION INTRAVENOUS; SUBCUTANEOUS at 09:51

## 2020-08-09 RX ADMIN — ONDANSETRON 4 MILLIGRAM(S): 8 TABLET, FILM COATED ORAL at 22:13

## 2020-08-09 RX ADMIN — Medication 200 MILLIGRAM(S): at 09:51

## 2020-08-09 RX ADMIN — SODIUM CHLORIDE 75 MILLILITER(S): 9 INJECTION INTRAMUSCULAR; INTRAVENOUS; SUBCUTANEOUS at 21:21

## 2020-08-09 RX ADMIN — Medication 650 MILLIGRAM(S): at 17:17

## 2020-08-09 RX ADMIN — Medication 650 MILLIGRAM(S): at 09:52

## 2020-08-09 NOTE — PROGRESS NOTE ADULT - ASSESSMENT
- CT abdomen/pelvis findings show proctocolitis  - cont cipro/flagyl  - cont NPO w ivf  - had positive covid PCR on 8/4 but is negative in hospital  - will repeat PCR and id negative can d/c isolation  - d/w Dr. Moseley. Will need colonoscopy  - will start sub q heparin

## 2020-08-09 NOTE — PROGRESS NOTE ADULT - SUBJECTIVE AND OBJECTIVE BOX
Patient is a 20y old  Male who presents with a chief complaint of complain of abdominal pain (08 Aug 2020 17:30)      HPI:  19 y/o Male with PMHx of gastroenteritis, colitis in 2019 who presents ambulatory to [facility] with complain of bright red blood with every Bowel movement for the past week. Endorses associated +lower abd pain and +fevers. Patient + for COVID-19 on 8/4/2020 in GI clinic. Patient has been trying to see a GI doctor but he was asked to get a COVID test first, which turned out to be positive. Patient decided to come to ED for worsening abdominal pain.    Patient tolerating clear liquids. COVID testing negative x 2. Continues to note some rectal bleeding with elevated inflammatory markers. Awaiting stool cultures.     PMHx:  Colitis (08 Aug 2020 03:18)      PAST MEDICAL & SURGICAL HISTORY:  No significant past surgical history      MEDICATIONS  (STANDING):  ciprofloxacin   IVPB 400 milliGRAM(s) IV Intermittent every 12 hours  metroNIDAZOLE  IVPB 500 milliGRAM(s) IV Intermittent every 8 hours  sodium chloride 0.9%. 1000 milliLiter(s) (75 mL/Hr) IV Continuous <Continuous>    MEDICATIONS  (PRN):  acetaminophen   Tablet .. 650 milliGRAM(s) Oral every 4 hours PRN Temp greater or equal to 38C (100.4F), Mild Pain (1 - 3)  ALBUTerol    90 MICROgram(s) HFA Inhaler 2 Puff(s) Inhalation every 6 hours PRN Shortness of Breath and/or Wheezing      Allergies    No Known Allergies    Intolerances        Vital Signs Last 24 Hrs  T(C): 36.6 (09 Aug 2020 00:47), Max: 37.1 (08 Aug 2020 15:49)  T(F): 97.9 (09 Aug 2020 00:47), Max: 98.8 (08 Aug 2020 15:49)  HR: 77 (09 Aug 2020 00:47) (77 - 87)  BP: 93/33 (09 Aug 2020 00:47) (93/33 - 110/65)  BP(mean): --  RR: 17 (09 Aug 2020 00:47) (17 - 18)  SpO2: 99% (09 Aug 2020 00:47) (99% - 100%)    PHYSICAL EXAM:    Constitutional: NAD, well-developed  Respiratory: CTAB  Cardiovascular: S1 and S2, RRR, no M/G/R  Gastrointestinal: BS+, soft, NT/ND  LABS:                        12.7   14.70 )-----------( 295      ( 08 Aug 2020 10:45 )             40.5     08-08    141  |  107  |  5<L>  ----------------------------<  90  3.7   |  29  |  0.82    Ca    8.2<L>      08 Aug 2020 10:45  Phos  3.1     08-08  Mg     2.2     08-08    TPro  6.1  /  Alb  2.6<L>  /  TBili  0.3  /  DBili  x   /  AST  12<L>  /  ALT  21  /  AlkPhos  60  08-08    PT/INR - ( 07 Aug 2020 21:46 )   PT: 14.2 sec;   INR: 1.24 ratio         PTT - ( 07 Aug 2020 21:46 )  PTT:32.6 sec  LIVER FUNCTIONS - ( 08 Aug 2020 10:45 )  Alb: 2.6 g/dL / Pro: 6.1 gm/dL / ALK PHOS: 60 U/L / ALT: 21 U/L / AST: 12 U/L / GGT: x             RADIOLOGY & ADDITIONAL STUDIES:

## 2020-08-09 NOTE — PROGRESS NOTE ADULT - SUBJECTIVE AND OBJECTIVE BOX
Subjective:  still having diarrhea  no blood    MEDICATIONS  (STANDING):  ciprofloxacin   IVPB 400 milliGRAM(s) IV Intermittent every 12 hours  metroNIDAZOLE  IVPB 500 milliGRAM(s) IV Intermittent every 8 hours  sodium chloride 0.9%. 1000 milliLiter(s) (75 mL/Hr) IV Continuous <Continuous>    MEDICATIONS  (PRN):  acetaminophen   Tablet .. 650 milliGRAM(s) Oral every 4 hours PRN Temp greater or equal to 38C (100.4F), Mild Pain (1 - 3)  ALBUTerol    90 MICROgram(s) HFA Inhaler 2 Puff(s) Inhalation every 6 hours PRN Shortness of Breath and/or Wheezing      Allergies    No Known Allergies    Intolerances        REVIEW OF SYSTEMS:    CONSTITUTIONAL:  As per HPI.  HEENT:  Eyes:  No diplopia or blurred vision. ENT:  No earache, sore throat or runny nose.  CARDIOVASCULAR:  No pressure, squeezing, tightness, heaviness or aching about the chest, neck, axilla or epigastrium.  RESPIRATORY:  No cough, shortness of breath, PND or orthopnea.  GASTROINTESTINAL:  cramps, diarrhea  GENITOURINARY:  No dysuria, frequency or urgency.  MUSCULOSKELETAL:  no joint pain, deformity, tenderness  EXTREMITIES: no clubbing cyanosis,edema  SKIN:  No change in skin, hair or nails.  NEUROLOGIC:  No paresthesias, fasciculations, seizures or weakness.  PSYCHIATRIC:  No disorder of thought or mood.  ENDOCRINE:  No heat or cold intolerance, polyuria or polydipsia.  HEMATOLOGICAL:  No easy bruising or bleedings:    Vital Signs Last 24 Hrs  T(C): 36.1 (09 Aug 2020 08:34), Max: 37.1 (08 Aug 2020 15:49)  T(F): 97 (09 Aug 2020 08:34), Max: 98.8 (08 Aug 2020 15:49)  HR: 76 (09 Aug 2020 08:34) (76 - 87)  BP: 108/58 (09 Aug 2020 08:34) (93/33 - 108/58)  BP(mean): --  RR: 17 (09 Aug 2020 08:34) (17 - 18)  SpO2: 100% (09 Aug 2020 08:34) (99% - 100%)    PHYSICAL EXAMINATION:  SKIN: no rashes  HEAD: NC/AT  EYES: PERRLA, EOMI  EARS: TM's intact  NOSE: no abnormalities  NECK:  Supple. No lymphadenopathy. Jugular venous pressure not elevated. Carotids equal.   HEART:   S1S2 reg  CHEST:  Chest is clear to auscultation. Normal respiratory effort.  ABDOMEN:  Soft and nontender. BS hyper  EXTREMITIES:  no C/C/E  NEURO: AAO x 3, no focal deficts       LABS:                        12.7   14.70 )-----------( 295      ( 08 Aug 2020 10:45 )             40.5     08-08    141  |  107  |  5<L>  ----------------------------<  90  3.7   |  29  |  0.82    Ca    8.2<L>      08 Aug 2020 10:45  Phos  3.1     08-08  Mg     2.2     08-08    TPro  6.1  /  Alb  2.6<L>  /  TBili  0.3  /  DBili  x   /  AST  12<L>  /  ALT  21  /  AlkPhos  60  08-08    PT/INR - ( 07 Aug 2020 21:46 )   PT: 14.2 sec;   INR: 1.24 ratio         PTT - ( 07 Aug 2020 21:46 )  PTT:32.6 sec  Urinalysis Basic - ( 07 Aug 2020 21:46 )    Color: Yellow / Appearance: Clear / S.020 / pH: x  Gluc: x / Ketone: Trace  / Bili: Negative / Urobili: 1 mg/dL   Blood: x / Protein: 15 mg/dL / Nitrite: Negative   Leuk Esterase: Trace / RBC: 0-2 /HPF / WBC 0-2   Sq Epi: x / Non Sq Epi: Occasional / Bacteria: Occasional        RADIOLOGY & ADDITIONAL TESTS:

## 2020-08-09 NOTE — PROGRESS NOTE ADULT - ASSESSMENT
19 yo male with recurrent symptoms of colitis. On empiric antibiotics. Stool cultures pending. Patient will need eventual endoscopic evaluation.

## 2020-08-10 ENCOUNTER — TRANSCRIPTION ENCOUNTER (OUTPATIENT)
Age: 20
End: 2020-08-10

## 2020-08-10 VITALS
RESPIRATION RATE: 18 BRPM | OXYGEN SATURATION: 99 % | DIASTOLIC BLOOD PRESSURE: 56 MMHG | HEART RATE: 87 BPM | TEMPERATURE: 98 F | SYSTOLIC BLOOD PRESSURE: 111 MMHG

## 2020-08-10 PROCEDURE — 99239 HOSP IP/OBS DSCHRG MGMT >30: CPT

## 2020-08-10 PROCEDURE — 99232 SBSQ HOSP IP/OBS MODERATE 35: CPT

## 2020-08-10 RX ORDER — MOXIFLOXACIN HYDROCHLORIDE TABLETS, 400 MG 400 MG/1
1 TABLET, FILM COATED ORAL
Qty: 14 | Refills: 0
Start: 2020-08-10 | End: 2020-08-16

## 2020-08-10 RX ORDER — PANTOPRAZOLE SODIUM 20 MG/1
40 TABLET, DELAYED RELEASE ORAL
Refills: 0 | Status: DISCONTINUED | OUTPATIENT
Start: 2020-08-10 | End: 2020-08-10

## 2020-08-10 RX ORDER — METRONIDAZOLE 500 MG
1 TABLET ORAL
Qty: 21 | Refills: 0
Start: 2020-08-10 | End: 2020-08-16

## 2020-08-10 RX ADMIN — Medication 200 MILLIGRAM(S): at 10:47

## 2020-08-10 RX ADMIN — PANTOPRAZOLE SODIUM 40 MILLIGRAM(S): 20 TABLET, DELAYED RELEASE ORAL at 10:47

## 2020-08-10 RX ADMIN — Medication 100 MILLIGRAM(S): at 06:44

## 2020-08-10 RX ADMIN — HEPARIN SODIUM 5000 UNIT(S): 5000 INJECTION INTRAVENOUS; SUBCUTANEOUS at 10:47

## 2020-08-10 RX ADMIN — Medication 20 MILLIGRAM(S): at 10:47

## 2020-08-10 NOTE — CDI QUERY NOTE - NSCDIOTHERTXTBX_GEN_ALL_CORE_HH
This patient was admitted with colitis and is being worked up for IBS.      Sepsis was documented in the HP:    HP Adult 8-8-20 @ 3:18  1.  Sepsis  Colitis  -started on IV cipro and flagyl  -keep NPO and on IVF    Abnormal Vitals on arrival:     Temp: 102.2  HR: 106    Labs:  WBC Count: 13.46 K/uL (08.07.20 @ 21:46)  WBC Count: 14.70 K/uL (08.08.20 @ 10:45)  Lactate, Blood: 2.0 mmol/L (08.08.20 @ 10:45)    Can you please clarify the diagnosis of sepsis?  A) Sepsis ruled in, present on admission.  B) Sepsis ruled out.  C) Other, please specify:

## 2020-08-10 NOTE — PROGRESS NOTE ADULT - ASSESSMENT
19 yo male with recurrent symptoms of colitis with family hx of IBS.   On empiric antibiotics now and is feeling somewhat better and wants to go home and follow up as an outpatient for a colonoscopy.    Stool cultures are negative.  Concerning patient has IBD and will start po steroids empirically at this time 40mg daily and will taper as an outpatient.  Continue abx for now.    Ok to d/c home today vs. tomorrow and will arrange outpatient colonoscopy.    Discussed with pt, nurse.

## 2020-08-10 NOTE — DISCHARGE NOTE PROVIDER - HOSPITAL COURSE
Vital Signs Last 24 Hrs    T(C): 36.5 (10 Aug 2020 09:04), Max: 36.7 (09 Aug 2020 23:23)    T(F): 97.7 (10 Aug 2020 09:04), Max: 98.1 (09 Aug 2020 23:23)    HR: 80 (10 Aug 2020 09:04) (76 - 80)    BP: 101/49 (10 Aug 2020 09:04) (101/49 - 113/56)    BP(mean): --    RR: 17 (10 Aug 2020 09:04) (16 - 18)    SpO2: 100% (10 Aug 2020 09:04) (100% - 100%)        HEENT:   pupils equal and reactive, EOMI, no oropharyngeal lesions, erythema, exudates, oral thrush        NECK:   supple, no carotid bruits, no palpable lymph nodes, no thyromegaly        CV:  +S1, +S2, regular, no murmurs or rubs        RESP:   lungs clear to auscultation bilaterally, no wheezing, rales, rhonchi, good air entry bilaterally        BREAST:  not examined        GI:  abdomen soft, non-tender, non-distended, normal BS, no bruits, no abdominal masses, no palpable masses        RECTAL:  not examined        :  not examined        MSK:   normal muscle tone, no atrophy, no rigidity, no contractions        EXT:   no clubbing, no cyanosis, no edema, no calf pain, swelling or erythema        VASCULAR:  pulses equal and symmetric in the upper and lower extremities        NEURO:  AAOX3, no focal neurological deficits, follows all commands, able to move extremities spontaneously        SKIN:  no ulcers, lesions or rashes                Hospital Course:        21 y/o Male with PMHx of gastroenteritis, colitis in 2019 who presents ambulatory to [facility] with complain of bright red blood with every Bowel movement for the past week. Endorses associated +lower abd pain and +fevers. Patient + for COVID-19 on 8/4/2020 in GI clinic. Patient has been trying to see a GI doctor but he was asked to get a COVID test first, which turned out to be positive. Patient decided to come to ED for worsening abdominal pain.            - CT abdomen/pelvis findings show proctocolitis. Sepsis ruled in, present on admission.    - cont cipro/flagyl for 7 days more.     - had positive covid PCR on 8/4 but is negative in hospital    - d/w Dr. Moseley. Will need colonoscopy outpatient .     -suspicion for IBD, GI recco patient discharge on oral prednisone.

## 2020-08-10 NOTE — PROGRESS NOTE ADULT - REASON FOR ADMISSION
complain of abdominal pain

## 2020-08-10 NOTE — DISCHARGE NOTE PROVIDER - NSDCMRMEDTOKEN_GEN_ALL_CORE_FT
Cipro 500 mg oral tablet: 1 tab(s) orally 2 times a day   dicyclomine 20 mg oral tablet: 1 tab(s) orally 3 times a day  Flagyl 500 mg oral tablet: 1 tab(s) orally 3 times a day   predniSONE 20 mg oral tablet: 1 tab(s) orally 2 times a day

## 2020-08-10 NOTE — DISCHARGE NOTE NURSING/CASE MANAGEMENT/SOCIAL WORK - PATIENT PORTAL LINK FT
You can access the FollowMyHealth Patient Portal offered by [facility] by registering at the following website: [email or web address]. By joining [employer]’s FollowMyHealth portal, you will also be able to view your health information using other applications (apps) compatible with our system.

## 2020-08-10 NOTE — PROGRESS NOTE ADULT - SUBJECTIVE AND OBJECTIVE BOX
Patient is a 20y old  Male who presents with a chief complaint of complain of abdominal pain (09 Aug 2020 09:20)    HPI:  21 y/o Male with PMHx of gastroenteritis, colitis in 2019 who presents ambulatory to [facility] with complain of bright red blood with every Bowel movement for the past week. Endorses associated +lower abd pain and +fevers. Patient + for COVID-19 on 8/4/2020 in GI clinic. Patient has been trying to see a GI doctor but he was asked to get a COVID test first, which turned out to be positive. Patient decided to come to ED for worsening abdominal pain.    8/10/2020-  Pt reports some lower abdominal pain and tenderness.   No diarrhea overnight but was having rectal bleeding.   Cultures negative to date.     PMHx:  Colitis (08 Aug 2020 03:18)    PAST MEDICAL & SURGICAL HISTORY:  No significant past surgical history    MEDICATIONS  (STANDING):  ciprofloxacin   IVPB 400 milliGRAM(s) IV Intermittent every 12 hours  heparin   Injectable 5000 Unit(s) SubCutaneous every 12 hours  metroNIDAZOLE  IVPB 500 milliGRAM(s) IV Intermittent every 8 hours  predniSONE   Tablet 20 milliGRAM(s) Oral two times a day  sodium chloride 0.9%. 1000 milliLiter(s) (75 mL/Hr) IV Continuous <Continuous>    MEDICATIONS  (PRN):  acetaminophen   Tablet .. 650 milliGRAM(s) Oral every 4 hours PRN Temp greater or equal to 38C (100.4F), Mild Pain (1 - 3)  ALBUTerol    90 MICROgram(s) HFA Inhaler 2 Puff(s) Inhalation every 6 hours PRN Shortness of Breath and/or Wheezing    Allergies    No Known Allergies    Intolerances      SOCIAL HISTORY:  FAMILY HISTORY:  No pertinent family history in first degree relatives    REVIEW OF SYSTEMS:  CONSTITUTIONAL: No weakness, fevers or chills  RESPIRATORY: No cough, wheezing, hemoptysis; No shortness of breath  CARDIOVASCULAR: No chest pain or palpitations  GASTROINTESTINAL: Per HPI.     Vital Signs Last 24 Hrs  T(C): 36.5 (10 Aug 2020 09:04), Max: 36.7 (09 Aug 2020 23:23)  T(F): 97.7 (10 Aug 2020 09:04), Max: 98.1 (09 Aug 2020 23:23)  HR: 80 (10 Aug 2020 09:04) (76 - 80)  BP: 101/49 (10 Aug 2020 09:04) (101/49 - 113/56)  BP(mean): --  RR: 17 (10 Aug 2020 09:04) (16 - 18)  SpO2: 100% (10 Aug 2020 09:04) (100% - 100%)    PHYSICAL EXAM:  Constitutional: NAD, well-developed  Respiratory: CTAB  Cardiovascular: S1 and S2, RRR, no M/G/R  Gastrointestinal: BS+, soft, mild lower abdominal tenderness.     LABS:                        12.7   14.70 )-----------( 295      ( 08 Aug 2020 10:45 )             40.5     08-08    141  |  107  |  5<L>  ----------------------------<  90  3.7   |  29  |  0.82    Ca    8.2<L>      08 Aug 2020 10:45  Phos  3.1     08-08  Mg     2.2     08-08    TPro  6.1  /  Alb  2.6<L>  /  TBili  0.3  /  DBili  x   /  AST  12<L>  /  ALT  21  /  AlkPhos  60  08-08      LIVER FUNCTIONS - ( 08 Aug 2020 10:45 )  Alb: 2.6 g/dL / Pro: 6.1 gm/dL / ALK PHOS: 60 U/L / ALT: 21 U/L / AST: 12 U/L / GGT: x             RADIOLOGY & ADDITIONAL STUDIES:

## 2020-08-10 NOTE — DISCHARGE NOTE PROVIDER - CARE PROVIDER_API CALL
Prosper Coello)  Gastroenterology; Internal Medicine  [address] [place] [address]  Phone: [phone]  Fax: [phone]  Follow Up Time:

## 2020-08-10 NOTE — DISCHARGE NOTE PROVIDER - NSDCCPCAREPLAN_GEN_ALL_CORE_FT
PRINCIPAL DISCHARGE DIAGNOSIS  Diagnosis: Proctocolitis  Assessment and Plan of Treatment: continue with oral antibiotics for another 7 days.   Take oral prednisone for 7 days. Please follow up with Gastroenterology with in 5-7 days of discharge to evaluate need for further oral prednisone.      SECONDARY DISCHARGE DIAGNOSES  Diagnosis: Sepsis  Assessment and Plan of Treatment:

## 2020-08-13 DIAGNOSIS — A41.9 SEPSIS, UNSPECIFIED ORGANISM: ICD-10-CM

## 2020-08-13 DIAGNOSIS — E87.6 HYPOKALEMIA: ICD-10-CM

## 2020-08-13 DIAGNOSIS — K52.9 NONINFECTIVE GASTROENTERITIS AND COLITIS, UNSPECIFIED: ICD-10-CM

## 2020-08-20 PROBLEM — K52.9 NONINFECTIVE GASTROENTERITIS AND COLITIS, UNSPECIFIED: Chronic | Status: ACTIVE | Noted: 2020-08-10

## 2020-08-20 PROBLEM — U07.1 COVID-19: Chronic | Status: ACTIVE | Noted: 2020-08-10

## 2020-08-22 DIAGNOSIS — Z01.818 ENCOUNTER FOR OTHER PREPROCEDURAL EXAMINATION: ICD-10-CM

## 2020-08-23 ENCOUNTER — APPOINTMENT (OUTPATIENT)
Dept: DISASTER EMERGENCY | Facility: CLINIC | Age: 20
End: 2020-08-23

## 2020-08-24 LAB — SARS-COV-2 N GENE NPH QL NAA+PROBE: NOT DETECTED

## 2020-08-26 ENCOUNTER — RESULT REVIEW (OUTPATIENT)
Age: 20
End: 2020-08-26

## 2020-08-26 ENCOUNTER — OUTPATIENT (OUTPATIENT)
Dept: OUTPATIENT SERVICES | Facility: HOSPITAL | Age: 20
LOS: 1 days | Discharge: HOME | End: 2020-08-26
Payer: COMMERCIAL

## 2020-08-26 VITALS
RESPIRATION RATE: 15 BRPM | TEMPERATURE: 98 F | DIASTOLIC BLOOD PRESSURE: 82 MMHG | OXYGEN SATURATION: 100 % | HEART RATE: 68 BPM | HEIGHT: 74 IN | WEIGHT: 160.06 LBS | SYSTOLIC BLOOD PRESSURE: 121 MMHG

## 2020-08-26 DIAGNOSIS — K52.9 NONINFECTIVE GASTROENTERITIS AND COLITIS, UNSPECIFIED: ICD-10-CM

## 2020-08-26 PROCEDURE — 88305 TISSUE EXAM BY PATHOLOGIST: CPT | Mod: 26

## 2020-08-26 PROCEDURE — 88305 TISSUE EXAM BY PATHOLOGIST: CPT

## 2020-08-31 DIAGNOSIS — K51.90 ULCERATIVE COLITIS, UNSPECIFIED, WITHOUT COMPLICATIONS: ICD-10-CM

## 2020-08-31 DIAGNOSIS — R19.7 DIARRHEA, UNSPECIFIED: ICD-10-CM

## 2020-09-02 ENCOUNTER — EMERGENCY (EMERGENCY)
Facility: HOSPITAL | Age: 20
LOS: 0 days | Discharge: HOME | End: 2020-09-02
Payer: COMMERCIAL

## 2020-09-02 VITALS
HEIGHT: 74 IN | DIASTOLIC BLOOD PRESSURE: 72 MMHG | OXYGEN SATURATION: 100 % | RESPIRATION RATE: 18 BRPM | SYSTOLIC BLOOD PRESSURE: 101 MMHG | HEART RATE: 64 BPM | TEMPERATURE: 99 F

## 2020-09-02 DIAGNOSIS — Z03.818 ENCOUNTER FOR OBSERVATION FOR SUSPECTED EXPOSURE TO OTHER BIOLOGICAL AGENTS RULED OUT: ICD-10-CM

## 2020-09-02 DIAGNOSIS — U07.1 COVID-19: ICD-10-CM

## 2020-09-02 PROBLEM — K52.9 NONINFECTIVE GASTROENTERITIS AND COLITIS, UNSPECIFIED: Chronic | Status: ACTIVE | Noted: 2020-08-26

## 2020-09-02 PROCEDURE — 99283 EMERGENCY DEPT VISIT LOW MDM: CPT

## 2020-09-02 PROCEDURE — U0003: CPT

## 2020-09-02 NOTE — ED STATDOCS - PHYSICAL EXAMINATION
Constitutional: NAD AAOx3. Nontoxic, well appearing. Speaking full sentences  w/o distress  Eyes: EOMI, pupils equal  Head: Normocephalic atraumatic  Mouth: no airway obstruction  Cardiac: s1h1ray   Resp: Lungs CTAB  GI: Abd s/nt/nd  Neuro: motor and sensory intact

## 2020-09-02 NOTE — ED STATDOCS - PATIENT PORTAL LINK FT
You can access the FollowMyHealth Patient Portal offered by  by registering at the following website: [email or web address]. By joining [employer]’s FollowMyHealth portal, you will also be able to view your health information using other applications (apps) compatible with our system.

## 2020-09-02 NOTE — ED ADULT TRIAGE NOTE - CHIEF COMPLAINT QUOTE
Patient presents requesting COVID testing. Patient was treated, evaluated and discharged by intake provider. Please see provider's notes for assessment.

## 2020-09-03 LAB — SARS-COV-2 RNA SPEC QL NAA+PROBE: DETECTED

## 2020-12-03 NOTE — PATIENT PROFILE ADULT - FALL HARM RISK
[FreeTextEntry1] : EKG 12/3/2020.  Sinus bradycardia.  Heart rate 50.  Otherwise within normal limits with slight ST "coving" and notching in lead V3, chronic finding.  Normal variant.\par EKG 11/8/2019.  Sinus bradycardia.  Heart rate 53.  Slight ST "cove" and notching V3, consider within normal limits. N/A

## 2020-12-21 PROBLEM — Z87.09 HISTORY OF PHARYNGITIS: Status: RESOLVED | Noted: 2019-07-01 | Resolved: 2020-12-21

## 2021-09-03 ENCOUNTER — APPOINTMENT (OUTPATIENT)
Dept: DISASTER EMERGENCY | Facility: CLINIC | Age: 21
End: 2021-09-03

## 2021-09-04 LAB — SARS-COV-2 N GENE NPH QL NAA+PROBE: NOT DETECTED

## 2021-09-07 ENCOUNTER — RESULT REVIEW (OUTPATIENT)
Age: 21
End: 2021-09-07

## 2022-04-19 NOTE — CDI QUERY NOTE - NSCDI_DOCCLARIFY_GEN_ALL_CORE_FT
In responding to this request, please exercise your independent professional judgment.  The fact that a question is asked does not imply that any particular answer is desired or expected. Name band;

## 2023-05-08 NOTE — REVIEW OF SYSTEMS
[As Noted in HPI] : as noted in HPI [Abdominal Pain] : abdominal pain [Diarrhea] : diarrhea [Negative] : Heme/Lymph [FreeTextEntry2] : BRET Tumor Depth: Less than 6mm from granular layer and no invasion beyond the subcutaneous fat

## 2024-05-16 ENCOUNTER — EMERGENCY (EMERGENCY)
Facility: HOSPITAL | Age: 24
LOS: 0 days | Discharge: HOME | End: 2024-05-16
Attending: STUDENT IN AN ORGANIZED HEALTH CARE EDUCATION/TRAINING PROGRAM
Payer: COMMERCIAL

## 2024-05-16 VITALS
OXYGEN SATURATION: 98 % | HEART RATE: 70 BPM | TEMPERATURE: 98 F | HEIGHT: 74 IN | SYSTOLIC BLOOD PRESSURE: 133 MMHG | WEIGHT: 225.09 LBS | RESPIRATION RATE: 20 BRPM | DIASTOLIC BLOOD PRESSURE: 73 MMHG

## 2024-05-16 DIAGNOSIS — R55 SYNCOPE AND COLLAPSE: ICD-10-CM

## 2024-05-16 PROCEDURE — 99283 EMERGENCY DEPT VISIT LOW MDM: CPT | Mod: 25

## 2024-05-16 PROCEDURE — 93010 ELECTROCARDIOGRAM REPORT: CPT

## 2024-05-16 PROCEDURE — 99284 EMERGENCY DEPT VISIT MOD MDM: CPT

## 2024-05-16 PROCEDURE — 93005 ELECTROCARDIOGRAM TRACING: CPT

## 2024-05-16 NOTE — ED STATDOCS - PROGRESS NOTE DETAILS
PA: Patient is a 23 year old male with no pertinent PMHx who presents to ED c/o near-syncope with x2 episodes of dizziness while at PCP office today. Denies chest pain, SOB, vomiting. Non-smoker, no EtOH use. ~Jesse Singer PA-C PA note: Patient re-examined and re-evaluated. Patient feels much better at this time. ED evaluation, Diagnosis and management discussed with the patient in detail. Workup results discussed with ED attending, [place] to [place] home. Close PMD follow up encouraged, aftercare to assist with scheduling appointment ASAP. Strict ED return instructions discussed in detail and patient given the opportunity to ask any questions about their discharge diagnosis and instructions. Patient verbalized understanding. ~ Jesse Singer PA-C

## 2024-05-16 NOTE — ED STATDOCS - CLINICAL SUMMARY MEDICAL DECISION MAKING FREE TEXT BOX
Adult male presents to ED c/o vasovagal syncope in setting of giving blood with prior history. Patient improved. Vitals normal. 12 lead NSR. Given asymptomatic will discharge with outpatient followup. Adult male presents to ED c/o vasovagal syncope in setting of giving blood with prior history. Patient improved. Vitals normal. 12 lead NSR. Given asymptomatic will discharge with outpatient followup.    PA note: Patient re-examined and re-evaluated. Patient feels much better at this time. ED evaluation, Diagnosis and management discussed with the patient in detail. Workup results discussed with ED attending, OK to dc home. Close PMD follow up encouraged, aftercare to assist with scheduling appointment ASAP. Strict ED return instructions discussed in detail and patient given the opportunity to ask any questions about their discharge diagnosis and instructions. Patient verbalized understanding. ~ Jesse Singer PA-C

## 2024-05-16 NOTE — ED STATDOCS - PATIENT PORTAL LINK FT
You can access the FollowMyHealth Patient Portal offered by [facility] by registering at the following website: [email or web address]. By joining [employer]’s FollowMyHealth portal, you will also be able to view your health information using other applications (apps) compatible with our system.

## 2024-05-16 NOTE — ED ADULT TRIAGE NOTE - CHIEF COMPLAINT QUOTE
PT presents to er from his PCP for a near syncopal episode, pt denies medical history, chest pain, sob, fevers at this time.

## 2024-05-16 NOTE — ED STATDOCS - PHYSICAL EXAMINATION
PA NOTE: GEN: AOX3, NAD. HEENT: Throat clear. Airway is patent. EYES: PERRLA. EOMI. Head: NC/AT. NECK: Supple, No JVD. FROM. C-spine non-tender. CV:S1S2, RRR, LUNGS: Non-labored breathing, no tachypnea. O2sat 100% RA. CTA b/l. No w/r/r. CHEST: Equal chest expansion and rise. No deformity. ABD: Soft, NT/ND, no rebound, no guarding. No CVAT. EXT: No e/c/c. 2+ distal pulses. SKIN: No rashes. NEURO: No focal deficits. CN II-XII intact. FROM. 5/5 motor and sensory. ~Jesse Singer PA-C

## 2024-05-16 NOTE — ED STATDOCS - OBJECTIVE STATEMENT
----- Message from Keyur Woodard MD sent at 6/7/2021  8:19 AM CDT -----  Advise thyroid ultrasound shows some residual thyroid tissue in the right thyroid bed which is unchanged from the CT scan we did in 2018. It has not grown significantly and is not pushing on the wind pipe or the esophagus to account for her pressure symptoms there.  She probably is most likely feeling residual scar tissue causing her symptoms  
Informed patient of information below.  Patient verbalized understanding.  
Informed patient's  of below information as patient was sleeping.  Donald verbalized understanding and asked if there was any correlation to fibromyalgia and hair loss to thyroid removal.   
Not that I am aware of.  Endocrinology may know more.  
23 year old male with no pertinent PMHx; presents to ED c/o near-syncope with x2 episodes of dizziness while at PCP office today, patient states they were talking about blood work and seeing a cardiologist when this started happened. Similar episodes in the past. At PCP for routine physical. Denies chest pain, SOB, vomiting. No falls. No chronic medication. Non-smoker, no EtOH use. Denies abdominal surgeries. Patient improved from PTA.

## 2024-05-16 NOTE — ED STATDOCS - ATTENDING APP SHARED VISIT CONTRIBUTION OF CARE
I, Nick Carballo, DO personally saw the patient with [facility].  I have personally performed a face to face diagnostic evaluation on this patient.  I have reviewed the [facility] note and agree with the history, exam, and plan of care, except as noted.  I personally saw the patient and performed a substantive portion of the visit including all aspects of the medical decision making.

## 2024-12-06 NOTE — ED ADULT NURSE NOTE - OBJECTIVE STATEMENT
No Pt c/o of bloody BM for the past week. Endorses associated +lower abd pain and +fevers. Pt + for COVID-19 on 8/4.